# Patient Record
Sex: MALE | Race: WHITE
[De-identification: names, ages, dates, MRNs, and addresses within clinical notes are randomized per-mention and may not be internally consistent; named-entity substitution may affect disease eponyms.]

---

## 2018-06-19 ENCOUNTER — HOSPITAL ENCOUNTER (INPATIENT)
Dept: HOSPITAL 74 - YASAS | Age: 23
LOS: 2 days | Discharge: LEFT BEFORE DISCHARGE | DRG: 770 | End: 2018-06-21
Attending: INTERNAL MEDICINE | Admitting: INTERNAL MEDICINE
Payer: COMMERCIAL

## 2018-06-19 VITALS — BODY MASS INDEX: 27.3 KG/M2

## 2018-06-19 DIAGNOSIS — F11.20: Primary | ICD-10-CM

## 2018-06-19 DIAGNOSIS — A69.20: ICD-10-CM

## 2018-06-19 PROCEDURE — HZ2ZZZZ DETOXIFICATION SERVICES FOR SUBSTANCE ABUSE TREATMENT: ICD-10-PCS | Performed by: INTERNAL MEDICINE

## 2018-06-19 RX ADMIN — Medication SCH MG: at 22:13

## 2018-06-19 NOTE — HP
COWS





- Scale


Resting Pulse: 0= [place] 80 or Below


Sweatin=Flushed/Facial Moisture


Restless Observation: 1= Difficult to Sit Still


Pupil Size: 1= Pupils >than Normal


Bone or Joint Aches: 2= Severe Diffuse Aches


Runny Nose/ Eye Tearin= Runny Nose/Eyes


GI Upset > 30mins: 2= Nausea/Diarrhea


Tremor Observation: 1= Tremor Felt, Not Seen


Yawning Observation: 1= 1-2x During Session


Anxiety or Irritability: 2=Irritable/Anxious


Goose Flesh Skin: 3=Piloerection


COWS Score: 17





Admission ROS [facility]





- HPI


Allergies/Adverse Reactions: 


 Allergies











Allergy/AdvReac Type Severity Reaction Status Date / Time


 


No Known Allergies Allergy   Verified 18 16:23














- Ebola screening


Have you traveled outside of the country in the last 21 days: No


Have you had contact with anyone from an Ebola affected area: No


Have you been sick,other than usual withdrawal symptoms: No


Do you have a fever: No





Admission Physical Exam [facility]





- Vital Signs


Vital Signs: 


 Vital Signs - 24 hr











  18





  16:07


 


Temperature 97.6 F


 


Pulse Rate 63


 


Respiratory 19





Rate 


 


Blood Pressure 134/71














- Diagnostic


(1) Uncomplicated opioid dependence


Current Visit: Yes   Status: Chronic   





[facility] Breath Alcohol Content


Breath Alcohol Content: 0





Urine Drug Screen





- Results


Drug Screen Negative: No


Urine Drug Screen Results: MARIMAR-Cocaine, OPI-Opiates, BZO-Benzodiazepines

## 2018-06-19 NOTE — HP
COWS





- Scale


Resting Pulse: 0= [place] 80 or Below


Sweatin=Flushed/Facial Moisture


Restless Observation: 1= Difficult to Sit Still


Pupil Size: 1= Pupils >than Normal


Bone or Joint Aches: 2= Severe Diffuse Aches


Runny Nose/ Eye Tearin= Nasal Congestion


GI Upset > 30mins: 1= Stomach Cramp


Tremor Observation: 1= Tremor Felt, Not Seen


Yawning Observation: 1= 1-2x During Session


Anxiety or Irritability: 2=Irritable/Anxious


Goose Flesh Skin: 3=Piloerection


COWS Score: 15





Admission ROS S





- HPI


Chief Complaint: 





WITHDRAWAL SYMPTOMS 


Allergies/Adverse Reactions: 


 Allergies











Allergy/AdvReac Type Severity Reaction Status Date / Time


 


No Known Allergies Allergy   Verified 18 16:23











History of Present Illness: 





23 Y.O. MAN WITH A HISTORY OF HEROIN DEPENDENCE IS HERE SEEKING DETOX.  HE 

REPORTS HE COMPLETED DETOX AND REHAB AT [facility] IN .  DOES NOT 

HAVE A SIGNIFICANT PERIOD OF DRUG ABSTINENCE.  


Exam Limitations: No Limitations





- Ebola screening


Have you traveled outside of the country in the last 21 days: No


Have you had contact with anyone from an Ebola affected area: No


Have you been sick,other than usual withdrawal symptoms: No


Do you have a fever: No





- Review of Systems


Constitutional: Chills


EENT: reports: Tearing, Nose Congestion


Respiratory: reports: No Symptoms reported


Cardiac: reports: No Symptoms Reported


GI: reports: Diarrhea, Nausea


: reports: No Symptoms Reported


Musculoskeletal: reports: No Symptoms Reported


Integumentary: reports: No Symptoms Reported


Neuro: reports: Headache


Endocrine: reports: No Symptoms Reported


Hematology: reports: No Symptoms Reported


Psychiatric: reports: Judgement Intact, Mood/Affect Appropiate, Orientated x3, 

Anxious


Other Systems: Reviewed and Negative





Patient History





- Patient Medical History


Hx Anemia: No


Hx Asthma: No


Hx Chronic Obstructive Pulmonary Disease (COPD): No


Hx Cancer: No


Hx Cardiac Disorders: No


Hx Congestive Heart Failure: No


Hx Hypertension: No


Hx Hypercholesterolemia: No


Hx Pacemaker: No


HX Cerebrovascular Accident: No


Hx Seizures: No


Hx Dementia: No


Hx Diabetes: No


Hx Gastrointestinal Disorders: No


Hx Liver Disease: No


Hx Genitourinary Disorders: No


Hx Sexually Transmitted Disorders: No


Hx Renal Disease (ESRD): No


Hx Thyroid Disease: No


Hx Human Immunodeficiency Virus (HIV): No


Hx Hepatitis C: No


Hx Depression: No


Hx Suicide Attempt: No


Hx Bipolar Disorder: No


Hx Schizophrenia: No


Other Medical History: Lyme disease 





- Patient Surgical History


Past Surgical History: Yes


Hx Appendectomy: Yes


Anesthesia Reaction: No





- PPD History


Previous Implant?: Yes


Documented Results: Negative w/o proof


PPD to be Administered?: Yes





- Reproductive History


Patient is a Female of Child Bearing Age (11 -55 yrs old): No





- Smoking Cessation


Smoking history: Never smoked





- Substance & Tx. History


Hx Alcohol Use: No


Hx Substance Use: Yes


Substance Use Type: Cocaine, Heroin


Hx Substance Use Treatment: Yes (Detox/Rehab: 3/2018 at [facility] )





- Substances Abused


  ** Heroin


Route: Inhalation


Frequency: Daily


Amount used: 10 bags


Age of first use: 22


Date of Last Use: 18





  ** Cocaine


Route: Inhalation


Frequency: 1-3 times last 30 days


Amount used: 1/2 gram- 2 grams


Age of first use: 21


Date of Last Use: 18





Family Disease History





- Family Disease History


Family History: Denies





Admission Physical Exam [facility]





- Vital Signs


Vital Signs: 


 Vital Signs - 24 hr











  18





  16:07


 


Temperature 97.6 F


 


Pulse Rate 63


 


Respiratory 19





Rate 


 


Blood Pressure 134/71














- Physical


General Appearance: Yes: No Apparent Distress, Nourished, Appropriately Dressed


HEENTM: Yes: Hearing grossly Normal, Normocephalic, Normal Voice


Respiratory: Yes: Chest Non-Tender, Lungs Clear, Normal Breath Sounds, No 

Respiratory Distress, No Accessory Muscle Use


Neck: Yes: No masses,lesions,Nodules, Trachea in good position


Breast: Yes: Breast Exam Deferred


Cardiology: Yes: Regular Rhythm, Regular Rate


Abdominal: Yes: Normal Bowel Sounds, Non Tender


Genitourinary: Yes: Other (No complaints reported)


Back: Yes: Normal Inspection


Musculoskeletal: Yes: full range of Motion, Gait Steady


Extremities: Yes: Normal Capillary Refill, Normal Inspection, Normal Range of 

Motion, Non-Tender


Neurological: Yes: CNs II-XII NML intact, Fully Oriented, Alert, Normal Mood/

Affect, Normal Response


Integumentary: Yes: Normal Color, Dry, Warm


Lymphatic: Yes: Within Normal Limits





- Diagnostic


(1) Lyme disease


Current Visit: Yes   Status: Chronic   





(2) Uncomplicated opioid dependence


Current Visit: Yes   Status: Chronic   





Cleared for Admission [facility]





- Detox or Rehab


[facility] Level of Care: Medically Managed


Detox Regimen/Protocol: Methadone





[facility] Breath Alcohol Content


Breath Alcohol Content: 0





Urine Drug Screen





- Results


Drug Screen Negative: No


Urine Drug Screen Results: MARIMAR-Cocaine, OPI-Opiates, BZO-Benzodiazepines

## 2018-06-20 LAB
ALBUMIN SERPL-MCNC: 3.6 G/DL (ref 3.4–5)
ALP SERPL-CCNC: 88 U/L (ref 45–117)
ALT SERPL-CCNC: 30 U/L (ref 12–78)
ANION GAP SERPL CALC-SCNC: 7 MMOL/L (ref 8–16)
APPEARANCE UR: (no result)
AST SERPL-CCNC: 20 U/L (ref 15–37)
BILIRUB SERPL-MCNC: 0.3 MG/DL (ref 0.2–1)
BILIRUB UR STRIP.AUTO-MCNC: NEGATIVE MG/DL
BUN SERPL-MCNC: 9 MG/DL (ref 7–18)
CALCIUM SERPL-MCNC: 9.1 MG/DL (ref 8.5–10.1)
CHLORIDE SERPL-SCNC: 102 MMOL/L (ref 98–107)
CO2 SERPL-SCNC: 31 MMOL/L (ref 21–32)
COLOR UR: YELLOW
CREAT SERPL-MCNC: 1 MG/DL (ref 0.7–1.3)
DEPRECATED RDW RBC AUTO: 13.5 % (ref 11.9–15.9)
GLUCOSE SERPL-MCNC: 92 MG/DL (ref 74–106)
HCT VFR BLD CALC: 42 % (ref 35.4–49)
HGB BLD-MCNC: 13.9 GM/DL (ref 11.7–16.9)
KETONES UR QL STRIP: NEGATIVE
LEUKOCYTE ESTERASE UR QL STRIP.AUTO: NEGATIVE
MCH RBC QN AUTO: 28.6 PG (ref 25.7–33.7)
MCHC RBC AUTO-ENTMCNC: 33.1 G/DL (ref 32–35.9)
MCV RBC: 86.2 FL (ref 80–96)
MUCOUS THREADS URNS QL MICRO: (no result)
NITRITE UR QL STRIP: NEGATIVE
PH UR: 5 [PH] (ref 5–8)
PLATELET # BLD AUTO: 269 K/MM3 (ref 134–434)
PMV BLD: 8.3 FL (ref 7.5–11.1)
POTASSIUM SERPLBLD-SCNC: 4.4 MMOL/L (ref 3.5–5.1)
PROT SERPL-MCNC: 6.9 G/DL (ref 6.4–8.2)
PROT UR QL STRIP: (no result)
PROT UR QL STRIP: NEGATIVE
RBC # BLD AUTO: 4.87 M/MM3 (ref 4–5.6)
SODIUM SERPL-SCNC: 140 MMOL/L (ref 136–145)
SP GR UR: 1.03 (ref 1–1.03)
UROBILINOGEN UR STRIP-MCNC: NEGATIVE MG/DL (ref 0.2–1)
WBC # BLD AUTO: 6.1 K/MM3 (ref 4–10)

## 2018-06-20 RX ADMIN — Medication SCH TAB: at 10:19

## 2018-06-20 RX ADMIN — Medication SCH: at 22:35

## 2018-06-20 NOTE — EKG
Test Reason : 

Blood Pressure : ***/*** mmHG

Vent. Rate : 065 BPM     Atrial Rate : 065 BPM

   P-R Int : 154 ms          QRS Dur : 084 ms

    QT Int : 406 ms       P-R-T Axes : 061 -04 017 degrees

   QTc Int : 422 ms

 

SINUS RHYTHM WITH MARKED SINUS ARRHYTHMIA

OTHERWISE NORMAL ECG

NO PREVIOUS ECGS AVAILABLE

Confirmed by KEITH TUTTLE MD ([id number]) on 6/20/2018 10:46:47 AM

 

Referred By:             Confirmed By:KEITH TUTTLE MD

## 2018-06-20 NOTE — CONSULT
[facility] Psychiatric Consult





- Data


Date of interview: 06/20/18


Admission source: [facility]


Identifying data: Patient is a 23 year old single [place] male, without kids, 

unemployed, and currently homeless. This is patient's first admission to detox 

at [facility]. Patient admitted to  for opioid dependence.


Substance Abuse History: Substance & Tx. History.  Hx Alcohol Use: No.  Hx 

Substance Use: Yes.  Substance Use Type: Cocaine, Heroin.  Hx Substance Use 

Treatment: Yes (Detox/Rehab: 3/2018 at [facility] ).  - Substances Abused.  ** 

Heroin.  Route: Inhalation.  Frequency: Daily.  Amount used: 10 bags.  Age of 

first use: 22.  Date of Last Use: 06/19/18.  ** Cocaine.  Route: Inhalation.  

Frequency: 1-3 times last 30 days.  Amount used: 1/2 gram- 2 grams.  Age of 

first use: 21.  Date of Last Use: 06/16/18


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


Psychiatric History: Patient denies h/o psychiatric hospitalizations, 

outpatient care, and suicide attempt.


Physical/Sexual Abuse/Trauma History: Denies.





Mental Status Exam





- Mental Status Exam


Alert and Oriented to: Time, Place, Person


Cognitive Function: Good


Patient Appearance: Well Groomed


Mood: Sad, Euthymic


Affect: Mood Congruent


Patient Behavior: Crying (Pt. tearful. ), Appropriate, Cooperative


Speech Pattern: Clear, Appropriate


Voice Loudness: Normal


Thought Process: Intact, Goal Oriented


Thought Disorder: Not Present


Hallucinations: Denies


Suicidal Ideation: Denies


Homicidal Ideation: Denies


Insight/Judgement: Poor


Sleep: Fair


Appetite: Fair


Muscle strength/Tone: Normal


Gait/Station: Normal





Psychiatric Findings





- Problem List (Axis 1, 2,3)


(1) Uncomplicated opioid dependence


Current Visit: Yes   Status: Acute   





- Initial Treatment Plan


Initial Treatment Plan: Psychoeducation provided. Detoxification in progress. 

Observation.

## 2018-06-20 NOTE — PN
[facility] COWS





- Scale


Resting Pulse: 0= [place] 80 or Below


Sweatin= Chills/Flushing


Restless Observation: 0= Sits Still


Pupil Size: 0= Normal to Room Light


Bone or Joint Aches: 0= None


Runny Nose/ Eye Tearin= None


GI Upset > 30mins: 3= Vomiting/Diarrhea


Tremor Observation of Outstretched Hands: 0= None


Yawning Observation: 2= >3x During Session


Anxiety or Irritability: 2=Irritable/Anxious


Goose Flesh Skin: 3=Piloerection


COWS Score: 11





B[facility] Progress Note (SOAP)


Subjective: 





Sweating, Vomiting, Fatigue, Constipation.


Objective: 


PATIENT A & O X 3. NO ACUTE DISTRESS.





18 12:46


 Vital Signs











Temperature  98.8 F   18 09:16


 


Pulse Rate  63   18 09:16


 


Respiratory Rate  16   18 09:16


 


Blood Pressure  108/65   18 09:16


 


O2 Sat by Pulse Oximetry (%)      








 Laboratory Tests











  18





  18:09 07:00 07:00


 


WBC   6.1 


 


RBC   4.87 


 


Hgb   13.9 


 


Hct   42.0 


 


MCV   86.2 


 


MCH   28.6 


 


MCHC   33.1 


 


RDW   13.5 


 


Plt Count   269 


 


MPV   8.3 


 


Sodium    140


 


Potassium    4.4


 


Chloride    102


 


Carbon Dioxide    31


 


Anion Gap    7 L


 


BUN    9


 


Creatinine    1.0


 


Creat Clearance w eGFR    > 60


 


Random Glucose    92


 


Calcium    9.1


 


Total Bilirubin    0.3


 


AST    20


 


ALT    30


 


Alkaline Phosphatase    88


 


Total Protein    6.9


 


Albumin    3.6


 


Urine Color  Yellow  


 


Urine Appearance  Turbid  


 


Urine pH  5.0  


 


Ur Specific Gravity  1.030  


 


Urine Protein  1+ H  


 


Urine Glucose (UA)  Negative  


 


Urine Ketones  Negative  


 


Urine Blood  Negative  


 


Urine Nitrite  Negative  


 


Urine Bilirubin  Negative  


 


Urine Urobilinogen  Negative  


 


Ur Leukocyte Esterase  Negative  


 


Urine WBC (Auto)  None  


 


Urine RBC (Auto)  1  


 


Urine Mucus  Rare  








LABS NOTED.


RPR RESULT PENDING.


18 12:47





Assessment: 





18 12:46


WITHDRAWAL SYMPTOMS.


Plan: 





CONTINUE DETOX.


INCREASE DAILY PO FLUID INTAKE.


ENCOURAGE AMBULATION.

## 2018-06-21 VITALS — DIASTOLIC BLOOD PRESSURE: 60 MMHG | HEART RATE: 74 BPM | SYSTOLIC BLOOD PRESSURE: 104 MMHG | TEMPERATURE: 99 F

## 2018-06-21 RX ADMIN — Medication SCH TAB: at 10:37

## 2018-06-21 NOTE — DS
[facility] Detox Discharge Summary


Admission Date: 


06/19/18





Discharge Date: 06/21/18





- History


Present History: Opioid Dependence


Additional Comments: 





PATIENT DOES NOT WISH TO STAY TO COMPLETE DETOX REGIMEN. RISKS OF LEAVING DETOX 

UNIT AGAINST MEDICAL ADVICE AND PRIOR TO COMPLETION OF DETOX REGIMEN EXPLAINED 

TO PATIENT. PATIENT ADVISED TO GO IMMEDIATELY TO NEAREST ER SHOULD ANY 

INTOLERABLE DETOX SYMPTOMS DEVELOP AT ANY TIME. PATIENT LEFT DETOX UNIT IN 

STABLE MEDICAL CONDITION.


Pertinent Past History: 





History of Lyme Disease.





- Physical Exam Results


Vital Signs: 


 Vital Signs











Temperature  99 F   06/21/18 09:09


 


Pulse Rate  74   06/21/18 09:09


 


Respiratory Rate  18   06/21/18 09:09


 


Blood Pressure  104/60   06/21/18 09:09


 


O2 Sat by Pulse Oximetry (%)      











Pertinent Admission Physical Exam Findings: 





WITHDRAWAL SYMPTOMS.





 Laboratory Tests











  06/19/18 06/20/18 06/20/18





  18:09 07:00 07:00


 


WBC   6.1 


 


RBC   4.87 


 


Hgb   13.9 


 


Hct   42.0 


 


MCV   86.2 


 


MCH   28.6 


 


MCHC   33.1 


 


RDW   13.5 


 


Plt Count   269 


 


MPV   8.3 


 


Sodium    140


 


Potassium    4.4


 


Chloride    102


 


Carbon Dioxide    31


 


Anion Gap    7 L


 


BUN    9


 


Creatinine    1.0


 


Creat Clearance w eGFR    > 60


 


Random Glucose    92


 


Calcium    9.1


 


Total Bilirubin    0.3


 


AST    20


 


ALT    30


 


Alkaline Phosphatase    88


 


Total Protein    6.9


 


Albumin    3.6


 


Urine Color  Yellow  


 


Urine Appearance  Turbid  


 


Urine pH  5.0  


 


Ur Specific Gravity  1.030  


 


Urine Protein  1+ H  


 


Urine Glucose (UA)  Negative  


 


Urine Ketones  Negative  


 


Urine Blood  Negative  


 


Urine Nitrite  Negative  


 


Urine Bilirubin  Negative  


 


Urine Urobilinogen  Negative  


 


Ur Leukocyte Esterase  Negative  


 


Urine WBC (Auto)  None  


 


Urine RBC (Auto)  1  


 


Urine Mucus  Rare  


 


RPR Titer   














  06/20/18





  07:00


 


WBC 


 


RBC 


 


Hgb 


 


Hct 


 


MCV 


 


MCH 


 


MCHC 


 


RDW 


 


Plt Count 


 


MPV 


 


Sodium 


 


Potassium 


 


Chloride 


 


Carbon Dioxide 


 


Anion Gap 


 


BUN 


 


Creatinine 


 


Creat Clearance w eGFR 


 


Random Glucose 


 


Calcium 


 


Total Bilirubin 


 


AST 


 


ALT 


 


Alkaline Phosphatase 


 


Total Protein 


 


Albumin 


 


Urine Color 


 


Urine Appearance 


 


Urine pH 


 


Ur Specific Gravity 


 


Urine Protein 


 


Urine Glucose (UA) 


 


Urine Ketones 


 


Urine Blood 


 


Urine Nitrite 


 


Urine Bilirubin 


 


Urine Urobilinogen 


 


Ur Leukocyte Esterase 


 


Urine WBC (Auto) 


 


Urine RBC (Auto) 


 


Urine Mucus 


 


RPR Titer  Nonreactive








LABS NOTED.





- Treatment


Hospital Course: Detoxed Safely





- Medication


Discharge Medications: 


Ambulatory Orders





NK [No Known Home Medication]  06/19/18 











- Diagnosis


(1) Uncomplicated opioid dependence


Status: Acute   





(2) Lyme disease


Status: Chronic   





- AMA


Did Patient Leave Against Medical Advice: Yes (PATIENT DID NOT WISH TO REMAIN 

TO COMPLETE DETOX REGIMEN.)

## 2018-06-21 NOTE — PN
[facility] COWS





- Scale


Resting Pulse: 0= [place] 80 or Below


Sweatin= Chills/Flushing


Restless Observation: 1= Difficult to Sit Still


Pupil Size: 0= Normal to Room Light


Bone or Joint Aches: 0= None


Runny Nose/ Eye Tearin= Runny Nose/Eyes


GI Upset > 30mins: 0= None


Tremor Observation of Outstretched Hands: 0= None


Yawning Observation: 2= >3x During Session


Anxiety or Irritability: 2=Irritable/Anxious


Goose Flesh Skin: 3=Piloerection


COWS Score: 11





[facility]HS Progress Note (SOAP)


Subjective: 





Fatigue, Anxious, Interrupted Sleep.


Objective: 


PATIENT A & O X 3. NO ACUTE DISTRESS.





18 12:25


 Vital Signs











Temperature  99 F   18 09:09


 


Pulse Rate  74   18 09:09


 


Respiratory Rate  18   18 09:09


 


Blood Pressure  104/60   18 09:09


 


O2 Sat by Pulse Oximetry (%)      








 Laboratory Tests











  18





  18:09 07:00 07:00


 


WBC   6.1 


 


RBC   4.87 


 


Hgb   13.9 


 


Hct   42.0 


 


MCV   86.2 


 


MCH   28.6 


 


MCHC   33.1 


 


RDW   13.5 


 


Plt Count   269 


 


MPV   8.3 


 


Sodium    140


 


Potassium    4.4


 


Chloride    102


 


Carbon Dioxide    31


 


Anion Gap    7 L


 


BUN    9


 


Creatinine    1.0


 


Creat Clearance w eGFR    > 60


 


Random Glucose    92


 


Calcium    9.1


 


Total Bilirubin    0.3


 


AST    20


 


ALT    30


 


Alkaline Phosphatase    88


 


Total Protein    6.9


 


Albumin    3.6


 


Urine Color  Yellow  


 


Urine Appearance  Turbid  


 


Urine pH  5.0  


 


Ur Specific Gravity  1.030  


 


Urine Protein  1+ H  


 


Urine Glucose (UA)  Negative  


 


Urine Ketones  Negative  


 


Urine Blood  Negative  


 


Urine Nitrite  Negative  


 


Urine Bilirubin  Negative  


 


Urine Urobilinogen  Negative  


 


Ur Leukocyte Esterase  Negative  


 


Urine WBC (Auto)  None  


 


Urine RBC (Auto)  1  


 


Urine Mucus  Rare  


 


RPR Titer   














  18





  07:00


 


WBC 


 


RBC 


 


Hgb 


 


Hct 


 


MCV 


 


MCH 


 


MCHC 


 


RDW 


 


Plt Count 


 


MPV 


 


Sodium 


 


Potassium 


 


Chloride 


 


Carbon Dioxide 


 


Anion Gap 


 


BUN 


 


Creatinine 


 


Creat Clearance w eGFR 


 


Random Glucose 


 


Calcium 


 


Total Bilirubin 


 


AST 


 


ALT 


 


Alkaline Phosphatase 


 


Total Protein 


 


Albumin 


 


Urine Color 


 


Urine Appearance 


 


Urine pH 


 


Ur Specific Gravity 


 


Urine Protein 


 


Urine Glucose (UA) 


 


Urine Ketones 


 


Urine Blood 


 


Urine Nitrite 


 


Urine Bilirubin 


 


Urine Urobilinogen 


 


Ur Leukocyte Esterase 


 


Urine WBC (Auto) 


 


Urine RBC (Auto) 


 


Urine Mucus 


 


RPR Titer  Nonreactive








LABS NOTED.


Assessment: 





18 12:25


WITHDRAWAL SYMPTOMS.


Plan: 





CONTINUE DETOX.


ENCOURAGE AMBULATION.